# Patient Record
Sex: FEMALE | HISPANIC OR LATINO | Employment: FULL TIME | URBAN - METROPOLITAN AREA
[De-identification: names, ages, dates, MRNs, and addresses within clinical notes are randomized per-mention and may not be internally consistent; named-entity substitution may affect disease eponyms.]

---

## 2018-04-24 ENCOUNTER — HOSPITAL ENCOUNTER (OUTPATIENT)
Dept: LAB | Facility: MEDICAL CENTER | Age: 43
End: 2018-04-24
Attending: FAMILY MEDICINE
Payer: COMMERCIAL

## 2018-04-24 LAB
ALBUMIN SERPL BCP-MCNC: 4.5 G/DL (ref 3.2–4.9)
ALBUMIN/GLOB SERPL: 1.3 G/DL
ALP SERPL-CCNC: 43 U/L (ref 30–99)
ALT SERPL-CCNC: 13 U/L (ref 2–50)
ANION GAP SERPL CALC-SCNC: 9 MMOL/L (ref 0–11.9)
AST SERPL-CCNC: 15 U/L (ref 12–45)
BASOPHILS # BLD AUTO: 0.7 % (ref 0–1.8)
BASOPHILS # BLD: 0.05 K/UL (ref 0–0.12)
BILIRUB SERPL-MCNC: 0.6 MG/DL (ref 0.1–1.5)
BUN SERPL-MCNC: 12 MG/DL (ref 8–22)
CALCIUM SERPL-MCNC: 9.8 MG/DL (ref 8.5–10.5)
CHLORIDE SERPL-SCNC: 101 MMOL/L (ref 96–112)
CHOLEST SERPL-MCNC: 181 MG/DL (ref 100–199)
CO2 SERPL-SCNC: 25 MMOL/L (ref 20–33)
CREAT SERPL-MCNC: 0.48 MG/DL (ref 0.5–1.4)
EOSINOPHIL # BLD AUTO: 0.1 K/UL (ref 0–0.51)
EOSINOPHIL NFR BLD: 1.5 % (ref 0–6.9)
ERYTHROCYTE [DISTWIDTH] IN BLOOD BY AUTOMATED COUNT: 37.4 FL (ref 35.9–50)
GLOBULIN SER CALC-MCNC: 3.6 G/DL (ref 1.9–3.5)
GLUCOSE SERPL-MCNC: 103 MG/DL (ref 65–99)
HCT VFR BLD AUTO: 42.2 % (ref 37–47)
HDLC SERPL-MCNC: 46 MG/DL
HGB BLD-MCNC: 15.2 G/DL (ref 12–16)
IMM GRANULOCYTES # BLD AUTO: 0.03 K/UL (ref 0–0.11)
IMM GRANULOCYTES NFR BLD AUTO: 0.4 % (ref 0–0.9)
LDLC SERPL CALC-MCNC: 111 MG/DL
LYMPHOCYTES # BLD AUTO: 2.55 K/UL (ref 1–4.8)
LYMPHOCYTES NFR BLD: 37.5 % (ref 22–41)
MCH RBC QN AUTO: 32 PG (ref 27–33)
MCHC RBC AUTO-ENTMCNC: 36 G/DL (ref 33.6–35)
MCV RBC AUTO: 88.8 FL (ref 81.4–97.8)
MONOCYTES # BLD AUTO: 0.45 K/UL (ref 0–0.85)
MONOCYTES NFR BLD AUTO: 6.6 % (ref 0–13.4)
NEUTROPHILS # BLD AUTO: 3.62 K/UL (ref 2–7.15)
NEUTROPHILS NFR BLD: 53.3 % (ref 44–72)
NRBC # BLD AUTO: 0 K/UL
NRBC BLD-RTO: 0 /100 WBC
PLATELET # BLD AUTO: 247 K/UL (ref 164–446)
PMV BLD AUTO: 9.2 FL (ref 9–12.9)
POTASSIUM SERPL-SCNC: 3.7 MMOL/L (ref 3.6–5.5)
PROT SERPL-MCNC: 8.1 G/DL (ref 6–8.2)
RBC # BLD AUTO: 4.75 M/UL (ref 4.2–5.4)
SODIUM SERPL-SCNC: 135 MMOL/L (ref 135–145)
TRIGL SERPL-MCNC: 118 MG/DL (ref 0–149)
TSH SERPL DL<=0.005 MIU/L-ACNC: 1.81 UIU/ML (ref 0.38–5.33)
WBC # BLD AUTO: 6.8 K/UL (ref 4.8–10.8)

## 2018-04-24 PROCEDURE — 80061 LIPID PANEL: CPT

## 2018-04-24 PROCEDURE — 36415 COLL VENOUS BLD VENIPUNCTURE: CPT

## 2018-04-24 PROCEDURE — 85025 COMPLETE CBC W/AUTO DIFF WBC: CPT

## 2018-04-24 PROCEDURE — 80053 COMPREHEN METABOLIC PANEL: CPT

## 2018-04-24 PROCEDURE — 84443 ASSAY THYROID STIM HORMONE: CPT

## 2018-10-30 ENCOUNTER — HOSPITAL ENCOUNTER (OUTPATIENT)
Dept: LAB | Facility: MEDICAL CENTER | Age: 43
End: 2018-10-30
Payer: COMMERCIAL

## 2018-10-30 LAB
BDY FAT % MEASURED: 36.4 %
BP DIAS: 44 MMHG
BP SYS: 114 MMHG
CHOLEST SERPL-MCNC: 200 MG/DL (ref 100–199)
DIABETES HTDIA: NO
EVENT NAME HTEVT: NORMAL
FASTING STATUS PATIENT QL REPORTED: NORMAL
GLUCOSE SERPL-MCNC: 103 MG/DL (ref 65–99)
HDLC SERPL-MCNC: 41 MG/DL
HYPERTENSION HTHYP: NO
LDLC SERPL CALC-MCNC: 109 MG/DL
SCREENING LOC CITY HTCIT: NORMAL
SCREENING LOC STATE HTSTA: NORMAL
SCREENING LOCATION HTLOC: NORMAL
SUBSCRIBER ID HTSID: NORMAL
TRIGL SERPL-MCNC: 248 MG/DL (ref 0–149)

## 2018-11-14 ENCOUNTER — OFFICE VISIT (OUTPATIENT)
Dept: URGENT CARE | Facility: CLINIC | Age: 43
End: 2018-11-14
Payer: COMMERCIAL

## 2018-11-14 VITALS
DIASTOLIC BLOOD PRESSURE: 60 MMHG | HEART RATE: 77 BPM | BODY MASS INDEX: 27.29 KG/M2 | SYSTOLIC BLOOD PRESSURE: 110 MMHG | TEMPERATURE: 97 F | OXYGEN SATURATION: 99 % | WEIGHT: 154 LBS | RESPIRATION RATE: 16 BRPM | HEIGHT: 63 IN

## 2018-11-14 DIAGNOSIS — M54.6 ACUTE LEFT-SIDED THORACIC BACK PAIN: ICD-10-CM

## 2018-11-14 PROCEDURE — 99203 OFFICE O/P NEW LOW 30 MIN: CPT | Performed by: PHYSICIAN ASSISTANT

## 2018-11-14 RX ORDER — CYCLOBENZAPRINE HCL 10 MG
10 TABLET ORAL 3 TIMES DAILY PRN
Qty: 30 TAB | Refills: 0 | Status: SHIPPED | OUTPATIENT
Start: 2018-11-14

## 2018-11-14 ASSESSMENT — ENCOUNTER SYMPTOMS
ABDOMINAL PAIN: 0
DIARRHEA: 0
DIZZINESS: 0
VOMITING: 0
BOWEL INCONTINENCE: 0
BACK PAIN: 1
LEG PAIN: 0
SHORTNESS OF BREATH: 0
CHILLS: 0
FEVER: 0
NUMBNESS: 0
NAUSEA: 0

## 2018-11-14 NOTE — PROGRESS NOTES
"Subjective:      Qing Gaxiola is a 42 y.o. female who presents with Back Pain (8 months ago but it went away now it hurts again on Lt upper. pain feels numbness and a poke and aslo feels like a spasm )            Back Pain   This is a recurrent problem. The current episode started 1 to 4 weeks ago (2 weeks). The problem occurs constantly. The problem is unchanged. The pain is present in the thoracic spine. The quality of the pain is described as aching and stabbing. The pain is at a severity of 3/10. The pain is moderate. The symptoms are aggravated by position. Pertinent negatives include no abdominal pain, bladder incontinence, bowel incontinence, chest pain, dysuria, fever, leg pain or numbness. She has tried heat and NSAIDs for the symptoms. The treatment provided mild relief.     Patient presents to urgent care reporting a 2 week history of left sided mid-back pain. She reports she has similar back pain about 1 year ago that resolved. No recent falls or trauma to the area. No bowel/bladder incontinence or distal numbness/tingling.     Review of Systems   Constitutional: Negative for chills and fever.   HENT: Negative for congestion.    Respiratory: Negative for shortness of breath.    Cardiovascular: Negative for chest pain.   Gastrointestinal: Negative for abdominal pain, bowel incontinence, diarrhea, nausea and vomiting.   Genitourinary: Negative.  Negative for bladder incontinence and dysuria.   Musculoskeletal: Positive for back pain.   Skin: Negative for rash.   Neurological: Negative for dizziness and numbness.          Objective:     /60   Pulse 77   Temp 36.1 °C (97 °F)   Resp 16   Ht 1.6 m (5' 3\")   Wt 69.9 kg (154 lb)   SpO2 99%   BMI 27.28 kg/m²      Physical Exam   Constitutional: She is oriented to person, place, and time. She appears well-developed and well-nourished. No distress.   HENT:   Head: Normocephalic and atraumatic.   Eyes: Pupils are equal, round, and reactive to " light.   Neck: Normal range of motion.   Cardiovascular: Normal rate.    Pulmonary/Chest: Effort normal.   Musculoskeletal: Normal range of motion.        Thoracic back: She exhibits tenderness and pain. She exhibits normal range of motion, no bony tenderness and no spasm.        Back:    No midline spinal tenderness or step off deformities noted. Muscular tenderness of left paraspinal musculature of thoracic region.    Neurological: She is alert and oriented to person, place, and time.   Skin: Skin is warm and dry. She is not diaphoretic.   Psychiatric: She has a normal mood and affect. Her behavior is normal.   Nursing note and vitals reviewed.       PMH:  has no past medical history on file.  MEDS:   Current Outpatient Prescriptions:   •  cyclobenzaprine (FLEXERIL) 10 MG Tab, Take 1 Tab by mouth 3 times a day as needed., Disp: 30 Tab, Rfl: 0  ALLERGIES: No Known Allergies  SURGHX: History reviewed. No pertinent surgical history.  SOCHX:  reports that she has quit smoking. She has never used smokeless tobacco. She reports that she does not drink alcohol or use drugs.  FH: family history is not on file.    Assessment/Plan:     1. Acute left-sided thoracic back pain  - cyclobenzaprine (FLEXERIL) 10 MG Tab; Take 1 Tab by mouth 3 times a day as needed.  Dispense: 30 Tab; Refill: 0   - Will cause sedation, avoid driving, operating heavy machinery, and drinking alcohol    Encouraged icing/heating 2-3 times daily followed by gentle massage and stretching. OTC nsaids as needed for pain. Call or return to office if symptoms persist or worsen. The patient demonstrated a good understanding and agreed with the treatment plan.

## 2020-01-24 ENCOUNTER — OFFICE VISIT (OUTPATIENT)
Dept: URGENT CARE | Facility: CLINIC | Age: 45
End: 2020-01-24
Payer: COMMERCIAL

## 2020-01-24 VITALS
DIASTOLIC BLOOD PRESSURE: 72 MMHG | WEIGHT: 152.8 LBS | TEMPERATURE: 97.6 F | HEART RATE: 64 BPM | OXYGEN SATURATION: 95 % | HEIGHT: 61 IN | BODY MASS INDEX: 28.85 KG/M2 | SYSTOLIC BLOOD PRESSURE: 110 MMHG | RESPIRATION RATE: 16 BRPM

## 2020-01-24 DIAGNOSIS — H69.93 EUSTACHIAN TUBE DYSFUNCTION, BILATERAL: ICD-10-CM

## 2020-01-24 DIAGNOSIS — M26.623 BILATERAL TEMPOROMANDIBULAR JOINT PAIN: ICD-10-CM

## 2020-01-24 DIAGNOSIS — H72.92 PERFORATION OF LEFT TYMPANIC MEMBRANE: ICD-10-CM

## 2020-01-24 DIAGNOSIS — H61.22 IMPACTED CERUMEN OF LEFT EAR: ICD-10-CM

## 2020-01-24 PROCEDURE — 69210 REMOVE IMPACTED EAR WAX UNI: CPT | Performed by: PHYSICIAN ASSISTANT

## 2020-01-24 PROCEDURE — 99214 OFFICE O/P EST MOD 30 MIN: CPT | Mod: 25 | Performed by: PHYSICIAN ASSISTANT

## 2020-01-24 RX ORDER — NEOMYCIN SULFATE, POLYMYXIN B SULFATE AND HYDROCORTISONE 10; 3.5; 1 MG/ML; MG/ML; [USP'U]/ML
3 SUSPENSION/ DROPS AURICULAR (OTIC) 4 TIMES DAILY
Qty: 6 ML | Refills: 0 | Status: SHIPPED | OUTPATIENT
Start: 2020-01-24 | End: 2020-01-29

## 2020-01-24 ASSESSMENT — ENCOUNTER SYMPTOMS
COUGH: 0
DIZZINESS: 0
HEADACHES: 1
CHILLS: 1
FEVER: 1
SORE THROAT: 1

## 2020-01-24 NOTE — PATIENT INSTRUCTIONS
Temporomandibular Joint Syndrome  Temporomandibular joint (TMJ) syndrome is a condition that affects the joints between your jaw and your skull. The TMJs are located near your ears and allow your jaw to open and close. These joints and the nearby muscles are involved in all movements of the jaw. People with TMJ syndrome have pain in the area of these joints and muscles. Chewing, biting, or other movements of the jaw can be difficult or painful.  TMJ syndrome can be caused by various things. In many cases, the condition is mild and goes away within a few weeks. For some people, the condition can become a long-term problem.  What are the causes?  Possible causes of TMJ syndrome include:  · Grinding your teeth or clenching your jaw. Some people do this when they are under stress.  · Arthritis.  · Injury to the jaw.  · Head or neck injury.  · Teeth or dentures that are not aligned well.  In some cases, the cause of TMJ syndrome may not be known.  What are the signs or symptoms?  The most common symptom is an aching pain on the side of the head in the area of the TMJ. Other symptoms may include:  · Pain when moving your jaw, such as when chewing or biting.  · Being unable to open your jaw all the way.  · Making a clicking sound when you open your mouth.  · Headache.  · Earache.  · Neck or shoulder pain.  How is this diagnosed?  Diagnosis can usually be made based on your symptoms, your medical history, and a physical exam. Your health care provider may check the range of motion of your jaw. Imaging tests, such as X-rays or an MRI, are sometimes done. You may need to see your dentist to determine if your teeth and jaw are lined up correctly.  How is this treated?  TMJ syndrome often goes away on its own. If treatment is needed, the options may include:  · Eating soft foods and applying ice or heat.  · Medicines to relieve pain or inflammation.  · Medicines to relax the muscles.  · A splint, bite plate, or mouthpiece to  prevent teeth grinding or jaw clenching.  · Relaxation techniques or counseling to help reduce stress.  · Transcutaneous electrical nerve stimulation (TENS). This helps to relieve pain by applying an electrical current through the skin.  · Acupuncture. This is sometimes helpful to relieve pain.  · Jaw surgery. This is rarely needed.  Follow these instructions at home:  · Take medicines only as directed by your health care provider.  · Eat a soft diet if you are having trouble chewing.  · Apply ice to the painful area.  ¨ Put ice in a plastic bag.  ¨ Place a towel between your skin and the bag.  ¨ Leave the ice on for 20 minutes, 2-3 times a day.  · Apply a warm compress to the painful area as directed.  · Massage your jaw area and perform any jaw stretching exercises as recommended by your health care provider.  · If you were given a mouthpiece or bite plate, wear it as directed.  · Avoid foods that require a lot of chewing. Do not chew gum.  · Keep all follow-up visits as directed by your health care provider. This is important.  Contact a health care provider if:  · You are having trouble eating.  · You have new or worsening symptoms.  Get help right away if:  · Your jaw locks open or closed.  This information is not intended to replace advice given to you by your health care provider. Make sure you discuss any questions you have with your health care provider.  Document Released: 09/12/2002 Document Revised: 08/17/2017 Document Reviewed: 07/23/2015  [employer] Interactive Patient Education © 2017 [employer]    Eustachian Tube Dysfunction  Introduction  The eustachian tube connects the middle ear to the back of the nose. It regulates air pressure in the middle ear by allowing air to move between the ear and nose. It also helps to drain fluid from the middle ear space. When the eustachian tube does not function properly, air pressure, fluid, or both can build up in the middle ear.  Eustachian tube dysfunction can  "affect one or both ears.  What are the causes?  This condition happens when the eustachian tube becomes blocked or cannot open normally. This may result from:  · Ear infections.  · Colds and other upper respiratory infections.  · Allergies.  · Irritation, such as from cigarette smoke or acid from the stomach coming up into the esophagus (gastroesophageal reflux).  · Sudden changes in air pressure, such as from descending in an airplane.  · Abnormal growths in the nose or throat, such as nasal polyps, tumors, or enlarged tissue at the back of the throat (adenoids).  What increases the risk?  This condition may be more likely to develop in people who smoke and people who are overweight. Eustachian tube dysfunction may also be more likely to develop in children, especially children who have:  · Certain birth defects of the mouth, such as cleft palate.  · Large tonsils and adenoids.  What are the signs or symptoms?  Symptoms of this condition may include:  · A feeling of fullness in the ear.  · Ear pain.  · Clicking or popping noises in the ear.  · Ringing in the ear.  · Hearing loss.  · Loss of balance.  Symptoms may get worse when the air pressure around you changes, such as when you travel to an area of high elevation or fly on an airplane.  How is this diagnosed?  This condition may be diagnosed based on:  · Your symptoms.  · A physical exam of your ear, nose, and throat.  · Tests, such as those that measure:  ¨ The movement of your eardrum (tympanogram).  ¨ Your hearing (audiometry).  How is this treated?  Treatment depends on the cause and severity of your condition. If your symptoms are mild, you may be able to relieve your symptoms by moving air into (\"popping\") your ears. If you have symptoms of fluid in your ears, treatment may include:  · Decongestants.  · Antihistamines.  · Nasal sprays or ear drops that contain medicines that reduce swelling (steroids).  In some cases, you may need to have a procedure to " drain the fluid in your eardrum (myringotomy). In this procedure, a small tube is placed in the eardrum to:  · Drain the fluid.  · Restore the air in the middle ear space.  Follow these instructions at home:  · Take over-the-counter and prescription medicines only as told by your health care provider.  · Use techniques to help pop your ears as recommended by your health care provider. These may include:  ¨ Chewing gum.  ¨ Yawning.  ¨ Frequent, forceful swallowing.  ¨ Closing your mouth, holding your nose closed, and gently blowing as if you are trying to blow air out of your nose.  · Do not do any of the following until your health care provider approves:  ¨ Travel to high altitudes.  ¨ Fly in airplanes.  ¨ Work in a pressurized cabin or room.  ¨ Scuba dive.  · Keep your ears dry. Dry your ears completely after showering or bathing.  · Do not smoke.  · Keep all follow-up visits as told by your health care provider. This is important.  Contact a health care provider if:  · Your symptoms do not go away after treatment.  · Your symptoms come back after treatment.  · You are unable to pop your ears.  · You have:  ¨ A fever.  ¨ Pain in your ear.  ¨ Pain in your head or neck.  ¨ Fluid draining from your ear.  · Your hearing suddenly changes.  · You become very dizzy.  · You lose your balance.  This information is not intended to replace advice given to you by your health care provider. Make sure you discuss any questions you have with your health care provider.  Document Released: 01/13/2017 Document Revised: 05/25/2017 Document Reviewed: 01/06/2016  © 2017 [employer]

## 2020-01-24 NOTE — PROGRESS NOTES
"Subjective:   Qing Gaxiola  is a 44 y.o. female who presents for Otalgia (pressure, when moving it feels like a pressure pain, and and has light headed when getting up in the morning ,the jaw and face hurts every morning when she wakes up it hurts possbile since her teeth is grinding or stiff together x 2 weeks )    This is a new problem.  Patient presents to urgent care with 2-week history of ear pressure, sensation that her ears need to \"pop\" and a pressure sensation in the head.  Patient reports that she had upper respiratory illness approximately 2 and half weeks ago with nasal congestion, mild sore throat and mild cough.  This eventually resolved but the ear symptoms persisted.  Patient denies change in hearing.    Patient also mentions that for the past 2 weeks she has been clenching her teeth very tightly when sleeping and she will wake up with pain in the bilateral jaws near the ear.      Otalgia    Associated symptoms include headaches and a sore throat. Pertinent negatives include no coughing or hearing loss.     Review of Systems   Constitutional: Positive for chills, fever and malaise/fatigue.   HENT: Positive for congestion, ear pain and sore throat. Negative for hearing loss and tinnitus.    Respiratory: Negative for cough.    Neurological: Positive for headaches. Negative for dizziness.   All other systems reviewed and are negative.    No Known Allergies  Reviewed past medical, surgical , social and family history.  Reviewed prescription and over-the-counter medications with patient and electronic health record today.     Objective:   /72   Pulse 64   Temp 36.4 °C (97.6 °F) (Temporal)   Resp 16   Ht 1.549 m (5' 1\")   Wt 69.3 kg (152 lb 12.8 oz)   SpO2 95%   BMI 28.87 kg/m²   Physical Exam  Vitals signs reviewed.   Constitutional:       General: She is not in acute distress.     Appearance: She is well-developed. She is not ill-appearing or toxic-appearing.   HENT:      Head: " Normocephalic and atraumatic.        Right Ear: Ear canal and external ear normal. Tympanic membrane is retracted.      Left Ear: Ear canal and external ear normal. There is impacted cerumen. Tympanic membrane is perforated and retracted.      Ears:        Comments: Left EAC 60% occluded with cerumen, this is removed using curette by provider, see procedure note.  Tympanic membrane appears to have small perforation, there is no discharge or drainage from the perforation and ear canal is within normal limits.     Nose: Nose normal.      Mouth/Throat:      Lips: Pink. No lesions.      Mouth: Mucous membranes are moist.      Pharynx: Oropharynx is clear. Uvula midline. No oropharyngeal exudate.      Comments: Tender to palpation and internal muscles of mastication on the left  Eyes:      General: Lids are normal.      Extraocular Movements: Extraocular movements intact.      Conjunctiva/sclera: Conjunctivae normal.      Pupils: Pupils are equal, round, and reactive to light.   Neck:      Musculoskeletal: Normal range of motion and neck supple.   Cardiovascular:      Rate and Rhythm: Normal rate and regular rhythm.      Heart sounds: Normal heart sounds. No murmur. No friction rub. No gallop.    Pulmonary:      Effort: Pulmonary effort is normal. No respiratory distress.      Breath sounds: Normal breath sounds.   Abdominal:      General: Bowel sounds are normal. There is no distension.      Palpations: Abdomen is soft. There is no mass.      Tenderness: There is no tenderness. There is no guarding or rebound.   Musculoskeletal: Normal range of motion.         General: No tenderness or deformity.   Lymphadenopathy:      Head:      Right side of head: No submental, submandibular, tonsillar, preauricular or posterior auricular adenopathy.      Left side of head: No submental, submandibular, tonsillar, preauricular or posterior auricular adenopathy.      Cervical: No cervical adenopathy.      Upper Body:      Right upper  body: No supraclavicular adenopathy.      Left upper body: No supraclavicular adenopathy.   Skin:     General: Skin is warm and dry.      Findings: No rash.   Neurological:      Mental Status: She is alert and oriented to person, place, and time.      Cranial Nerves: Cranial nerves are intact. No cranial nerve deficit.      Sensory: Sensation is intact. No sensory deficit.      Motor: Motor function is intact.      Coordination: Coordination is intact. Coordination normal.      Gait: Gait is intact.   Psychiatric:         Attention and Perception: Attention normal.         Mood and Affect: Mood and affect normal.         Speech: Speech normal.         Behavior: Behavior normal. Behavior is cooperative.         Thought Content: Thought content normal.         Judgment: Judgment normal.           Assessment/Plan:   1. Eustachian tube dysfunction, bilateral  - REFERRAL TO ENT    2. Perforation of left tympanic membrane  - REFERRAL TO ENT  - neomycin-polymyxin-HC (PEDIOTIC HC) 3.5-64192-0 Suspension; Place 3 Drops in ear 4 times a day for 5 days.  Dispense: 6 mL; Refill: 0    3. Impacted cerumen of left ear  - Ear Wax Removal    4. Bilateral temporomandibular joint pain    Printed information with patient education on eustachian tube dysfunction provided.  Encouraged over-the-counter Flonase nasal spray.  Referral placed to ear nose and throat.    Patient is given Pediotic eardrops and referral was placed to ENT for follow-up on possible perforation of left TM.    Avoid jaw thrusting, encourage soft foods, avoid chewing gum.  May use over-the-counter ibuprofen for TMJ pain not to exceed recommended daily dose.  Also recommend moist heat.  May want to speak with dentist regarding mouthguard.  Differential diagnosis, natural history, supportive care, and indications for immediate follow-up discussed.     Red flag warning symptoms and strict ER/follow-up precautions given.  The patient demonstrated a good understanding and  agreed with the treatment plan.  Please note that this note was created using voice recognition speech to text software. Every effort has been made to correct obvious errors.  However, I expect there are errors of grammar and possibly context that were not discovered prior to finalizing the note  SDi Morton PA-C

## 2020-01-24 NOTE — PROCEDURES
Ear Wax Removal  Date/Time: 1/24/2020 10:05 AM  Performed by: Patti Morton P.A.-C.  Authorized by: Patti Morton P.A.-C.     Anesthesia:  Local Anesthetic: none  Location details: left ear  Patient tolerance: Patient tolerated the procedure well with no immediate complications  Procedure type: curette   Sedation:  Patient sedated: no

## 2022-10-05 PROCEDURE — 99283 EMERGENCY DEPT VISIT LOW MDM: CPT

## 2022-10-06 ENCOUNTER — HOSPITAL ENCOUNTER (EMERGENCY)
Facility: MEDICAL CENTER | Age: 47
End: 2022-10-06
Attending: EMERGENCY MEDICINE
Payer: COMMERCIAL

## 2022-10-06 VITALS
DIASTOLIC BLOOD PRESSURE: 74 MMHG | RESPIRATION RATE: 14 BRPM | BODY MASS INDEX: 30.84 KG/M2 | WEIGHT: 163.36 LBS | HEIGHT: 61 IN | SYSTOLIC BLOOD PRESSURE: 133 MMHG | TEMPERATURE: 97.5 F | HEART RATE: 78 BPM | OXYGEN SATURATION: 98 %

## 2022-10-06 DIAGNOSIS — L02.419 ARMPIT ABSCESS: ICD-10-CM

## 2022-10-06 PROCEDURE — A9270 NON-COVERED ITEM OR SERVICE: HCPCS | Performed by: EMERGENCY MEDICINE

## 2022-10-06 PROCEDURE — 700102 HCHG RX REV CODE 250 W/ 637 OVERRIDE(OP): Performed by: EMERGENCY MEDICINE

## 2022-10-06 PROCEDURE — 304217 HCHG IRRIGATION SYSTEM

## 2022-10-06 PROCEDURE — 700111 HCHG RX REV CODE 636 W/ 250 OVERRIDE (IP): Performed by: EMERGENCY MEDICINE

## 2022-10-06 PROCEDURE — 303977 HCHG I & D

## 2022-10-06 RX ORDER — OXYCODONE HYDROCHLORIDE AND ACETAMINOPHEN 5; 325 MG/1; MG/1
2 TABLET ORAL ONCE
Status: COMPLETED | OUTPATIENT
Start: 2022-10-06 | End: 2022-10-06

## 2022-10-06 RX ORDER — SULFAMETHOXAZOLE AND TRIMETHOPRIM 800; 160 MG/1; MG/1
1 TABLET ORAL 2 TIMES DAILY
Qty: 10 TABLET | Refills: 0 | Status: SHIPPED | OUTPATIENT
Start: 2022-10-06 | End: 2022-10-11

## 2022-10-06 RX ORDER — HYDROCODONE BITARTRATE AND ACETAMINOPHEN 5; 325 MG/1; MG/1
1-2 TABLET ORAL EVERY 6 HOURS PRN
Qty: 12 TABLET | Refills: 0 | Status: SHIPPED | OUTPATIENT
Start: 2022-10-06 | End: 2022-10-09

## 2022-10-06 RX ORDER — SULFAMETHOXAZOLE AND TRIMETHOPRIM 800; 160 MG/1; MG/1
1 TABLET ORAL ONCE
Status: COMPLETED | OUTPATIENT
Start: 2022-10-06 | End: 2022-10-06

## 2022-10-06 RX ADMIN — OXYCODONE AND ACETAMINOPHEN 2 TABLET: 5; 325 TABLET ORAL at 02:08

## 2022-10-06 RX ADMIN — LIDOCAINE HYDROCHLORIDE 10 ML: 10 INJECTION, SOLUTION EPIDURAL; INFILTRATION; INTRACAUDAL; PERINEURAL at 02:15

## 2022-10-06 RX ADMIN — SULFAMETHOXAZOLE AND TRIMETHOPRIM 1 TABLET: 800; 160 TABLET ORAL at 02:05

## 2022-10-06 NOTE — DISCHARGE INSTRUCTIONS
Please wash out your wound at least 5 times a day in the shower over the next 2 to 3 days to keep the wound open and draining.  Take the pain meds as needed and complete antibiotics.  Return to the ED if the wound closes too early if you get worsening swelling or severe pain    Do not drive, operate any machinery, or partake in dangerous activities that require maximum physical and mental performance while taking the prescribed pain killer. Do not take tylenol or tylenol containing products in addition to the pain killer that was prescibed, as the excess tylenol can cause life threatening liver problems. Do not combine this drug with alcohol or other sedatives or narcotics. Follow up with your primary care doctor in regards to the future management of this medication.

## 2022-10-06 NOTE — ED TRIAGE NOTES
"Chief Complaint   Patient presents with    Arm Pain     Left armpit pain and swelling     Patient ambulatory to triage for the above complaint. Patient states that she had this same problem 10 years ago and it just started to come back 3 days ago. Patient states that in the past they had to drain the area.     Pt is alert and oriented, speaking in full sentences, follows commands and responds appropriately to questions. Resp are even and unlabored.      Pt placed in lobby. Pt educated on triage process. Pt encouraged to alert staff for any changes.     Patient and staff wearing appropriate PPE.    /70   Pulse 79   Temp 36.6 °C (97.8 °F) (Temporal)   Resp 18   Ht 1.549 m (5' 1\")   Wt 74.1 kg (163 lb 5.8 oz)   SpO2 96%    "

## 2022-10-06 NOTE — ED NOTES
Pt ambulated to room with steady gait. Dressed in gown. Call light in reach. Provided blankets for comfort. Up for ERP to see.

## 2022-10-06 NOTE — ED PROVIDER NOTES
"ED Provider Note    CHIEF COMPLAINT  Chief Complaint   Patient presents with    Arm Pain     Left armpit pain and swelling       HPI  Qing Gaxiola is a 46 y.o. female who presents to the emergency room with chief complaint of 3 days of left underarm swelling.  She states is extremely tender the touch and worse with movement.  She denies fevers chills weakness numbness or tingling.  She states has had a similar there 10 years ago but was much bigger and treated having a very large incision drainage.  No history of diabetes she states he taken ibuprofen and Tylenol home for pain without relief of symptoms.    REVIEW OF SYSTEMS  Positives as above. Pertinent negatives include fevers chills weakness numbness tingling arm swelling  All other review of systems are negative    PAST MEDICAL HISTORY       SOCIAL HISTORY  Social History     Tobacco Use    Smoking status: Former     Types: Cigarettes     Quit date: 10/1/2013     Years since quittin.0    Smokeless tobacco: Never   Vaping Use    Vaping Use: Never used   Substance and Sexual Activity    Alcohol use: No    Drug use: No    Sexual activity: Not on file       SURGICAL HISTORY  patient denies any surgical history    CURRENT MEDICATIONS  Home Medications       Reviewed by Bear Madden R.N. (Registered Nurse) on 10/05/22 at 2312  Med List Status: Not Addressed     Medication Last Dose Status   cyclobenzaprine (FLEXERIL) 10 MG Tab  Active                    ALLERGIES  No Known Allergies    PHYSICAL EXAM  VITAL SIGNS: /75   Pulse 88   Temp 36.4 °C (97.5 °F) (Temporal)   Resp 18   Ht 1.549 m (5' 1\")   Wt 74.1 kg (163 lb 5.8 oz)   SpO2 97% Comment: Room air  BMI 30.87 kg/m²    Pulse ox interpretation: I interpret this pulse ox as normal.  Constitutional: Alert in no apparent distress.  HENT: Normocephalic, Atraumatic, MMM  Eyes: PERound. Conjunctiva normal, non-icteric.   Heart: Regular rate and rhythm, no murmurs.    Lungs: Clear to " auscultation bilaterally. No resp distress, breath sounds equal  Abdomen: Non-tender, non-distended, normal bowel sounds  EXT: Left axilla there is old large scar approximately 7 cm cranial to that she is got an area of fluctuance approximately 4 cm x 2 cm without induration only mild erythema  Skin: Warm, Dry, No erythema, No rash.   Neurologic: Alert and oriented, Grossly non-focal.       DIFFERENTIAL DIAGNOSIS AND WORK UP PLAN    This is a 46 y.o. female who presents with left axillary abscess without cellulitis.  I consented the patient for incision drainage she will be treated with Bactrim as well as oral pain management.  Her tetanus is up-to-date    Pertinent Lab Findings  Labs Reviewed - No data to display    Radiology  No orders to display     The radiologist's interpretation of all radiological studies have been reviewed by me.    INCISION AND DRAINAGE PROCEDURE NOTE:  Patient identification was confirmed and consent was obtained.    Site: L arm pit  Sterile procedures observed  Needle size: 27  Anesthetic used (type and amt): lidocaine 1% without epi  Blade size: 11  Drainage: moderate blood and pus     Site anesthetized, incision made over site, wound drained and explored loculations, rinsed with copious amounts of normal saline, covered with dry, sterile dressing. Pt tolerated procedure well without complications. Instructions for care discussed verbally and pt provided with additional written instructions for homecare and f/u.      COURSE & MEDICAL DECISION MAKING  Pertinent Labs & Imaging studies reviewed. (See chart for details)    In prescribing controlled substances to this patient, I certify that I have obtained and reviewed the medical history of Qing Gaxiola. I have also made a good isabel effort to obtain applicable records from other providers who have treated the patient and records did not demonstrate any increased risk of substance abuse that would prevent me from prescribing  controlled substances.     I have conducted a physical exam and documented it. I have reviewed Ms. Gaxiola’s prescription history as maintained by the [place] Prescription Monitoring Program.     I have assessed the patient’s risk for abuse, dependency, and addiction using the validated Opioid Risk Tool available at https://www.mdcalc.com/zozldw-xasv-spjx-ort-narcotic-abuse.     Given the above, I believe the benefits of controlled substance therapy outweigh the risks. The reasons for prescribing controlled substances include non-narcotic, oral analgesic alternatives have been inadequate for pain control. Accordingly, I have discussed the risk and benefits, treatment plan, and alternative therapies with the patient.     Patient tolerated the procedure with little bit of difficulty she was sent home with oral antibiotics we discussed wash at least 5 times a day over the next 2 days and strict precautions.  She understands feels comfortable at home    I verified that the patient was wearing a mask and I was wearing appropriate PPE every time I entered the room. The patient's mask was on the patient at all times during my encounter except for a brief view of the oropharynx.    The patient will return for new or worsening symptoms and is stable at the time of discharge.    The patient is referred to a primary physician for blood pressure management, diabetic screening, and for all other preventative health concerns.    DISPOSITION:  Patient will be discharged home in stable condition.    FOLLOW UP:  Tate Rios M.D.  [address]  [facility] [id number] [phone]    Schedule an appointment as soon as possible for a visit       [facility], Emergency Dept  [address] [id number] [phone]    If symptoms worsen      OUTPATIENT MEDICATIONS:  New Prescriptions    HYDROCODONE-ACETAMINOPHEN (NORCO) 5-325 MG TAB PER TABLET    Take 1-2 Tablets by mouth every 6  hours as needed (severe pain) for up to 3 days.    SULFAMETHOXAZOLE-TRIMETHOPRIM (BACTRIM DS) 800-160 MG TABLET    Take 1 Tablet by mouth 2 times a day for 5 days.           FINAL IMPRESSION  1. Armpit abscess - Left  sulfamethoxazole-trimethoprim (BACTRIM DS) 800-160 MG tablet    HYDROcodone-acetaminophen (NORCO) 5-325 MG Tab per tablet                 Electronically signed by: Natividad Mckeon M.D., 10/6/2022 1:51 AM    This dictation has been created using voice recognition software and/or scribes. The accuracy of the dictation is limited by the abilities of the software and the expertise of the scribes. I expect there may be some errors of grammar and possibly content. I made every attempt to manually correct the errors within my dictation. However, errors related to voice recognition software and/or scribes may still exist and should be interpreted within the appropriate context.

## 2025-04-18 ENCOUNTER — HOSPITAL ENCOUNTER (EMERGENCY)
Facility: MEDICAL CENTER | Age: 50
End: 2025-04-18
Payer: COMMERCIAL

## 2025-04-18 ENCOUNTER — OFFICE VISIT (OUTPATIENT)
Dept: URGENT CARE | Facility: CLINIC | Age: 50
End: 2025-04-18
Payer: COMMERCIAL

## 2025-04-18 VITALS
HEART RATE: 70 BPM | SYSTOLIC BLOOD PRESSURE: 128 MMHG | WEIGHT: 164.9 LBS | BODY MASS INDEX: 31.13 KG/M2 | OXYGEN SATURATION: 99 % | DIASTOLIC BLOOD PRESSURE: 74 MMHG | RESPIRATION RATE: 14 BRPM | HEIGHT: 61 IN | TEMPERATURE: 97.1 F

## 2025-04-18 DIAGNOSIS — R51.9 ACUTE NONINTRACTABLE HEADACHE, UNSPECIFIED HEADACHE TYPE: ICD-10-CM

## 2025-04-18 DIAGNOSIS — H57.12 PAIN OF LEFT EYE: ICD-10-CM

## 2025-04-18 DIAGNOSIS — H57.89 RED EYE: ICD-10-CM

## 2025-04-18 PROCEDURE — 3078F DIAST BP <80 MM HG: CPT

## 2025-04-18 PROCEDURE — 3074F SYST BP LT 130 MM HG: CPT

## 2025-04-18 PROCEDURE — 99204 OFFICE O/P NEW MOD 45 MIN: CPT

## 2025-04-18 ASSESSMENT — ENCOUNTER SYMPTOMS
CHILLS: 0
WHEEZING: 0
FOCAL WEAKNESS: 0
SEIZURES: 0
NECK PAIN: 0
NAUSEA: 0
COUGH: 0
WEAKNESS: 0
EYE REDNESS: 1
SHORTNESS OF BREATH: 0
SPEECH CHANGE: 0
MYALGIAS: 0
SINUS PAIN: 0
ABDOMINAL PAIN: 0
FEVER: 0
LOSS OF CONSCIOUSNESS: 0
DIZZINESS: 0
VOMITING: 0
STRIDOR: 0
PHOTOPHOBIA: 0
EYE DISCHARGE: 0
SPUTUM PRODUCTION: 0
TINGLING: 0
DIARRHEA: 0
HEADACHES: 1
DOUBLE VISION: 0
EYE PAIN: 1
SORE THROAT: 0
SENSORY CHANGE: 0

## 2025-04-18 ASSESSMENT — VISUAL ACUITY: OU: 1

## 2025-04-18 NOTE — LETTER
ALHAJI  [facility]  [address] [id number]     April 18, 2025    Patient: Qing Gaxiola   YOB: 1975   Date of Visit: 4/18/2025       To Whom It May Concern:    Qing Gaxiola was seen and treated in our department on 4/18/2025.     Please excuse Qing from work 4/18/25-4/19/25.           Sincerely,     LEVON Larson.

## 2025-04-18 NOTE — PROGRESS NOTES
Subjective     Qing Gaxiola is a 49 y.o. female who presents with Eye Problem (xMonday. L eye red, painful, headache, dizzy)    HPI:   Qing is a 50yo female presenting today for evaluation of left eye redness and pain x4 days. Reports associated headache and tinnitus of left ear. Reports blurred vision with no vision loss or visual changes. Denies trauma to eye. No current foreign body sensation. Denies photophobia. She does not wear contacts. Denies flashes/floaters. No vomiting. Denies pain with extraocular movement. No tenderness to palpitation of periorbital cavity. Denies purulent discharge or crusting of eye lashes. No rash. Denies previous eye surgery.          Review of Systems   Constitutional:  Negative for chills, fever and malaise/fatigue.   HENT:  Positive for ear pain and tinnitus. Negative for congestion, ear discharge, sinus pain and sore throat.    Eyes:  Positive for pain and redness. Negative for double vision, photophobia and discharge.   Respiratory:  Negative for cough, sputum production, shortness of breath, wheezing and stridor.    Cardiovascular:  Negative for chest pain.   Gastrointestinal:  Negative for abdominal pain, diarrhea, nausea and vomiting.   Musculoskeletal:  Negative for myalgias and neck pain.   Skin:  Negative for itching and rash.   Neurological:  Positive for headaches. Negative for dizziness, tingling, sensory change, speech change, focal weakness, seizures, loss of consciousness and weakness.     History reviewed. No pertinent past medical history.     History reviewed. No pertinent surgical history.     Patient has no known allergies.     Social History     Tobacco Use    Smoking status: Former     Current packs/day: 0.00     Types: Cigarettes     Quit date: 10/1/2013     Years since quittin.5    Smokeless tobacco: Never   Vaping Use    Vaping status: Never Used   Substance Use Topics    Alcohol use: No    Drug use: No     History reviewed. No pertinent  "family history.     Medications, Allergies, and current problem list reviewed today in Epic.      Objective     /74   Pulse 70   Temp 36.2 °C (97.1 °F)   Resp 14   Ht 1.549 m (5' 1\")   Wt 74.8 kg (164 lb 14.4 oz)   SpO2 99%   BMI 31.16 kg/m²      Physical Exam  Vitals reviewed.   Constitutional:       General: She is not in acute distress.  HENT:      Head: No right periorbital erythema or left periorbital erythema.      Jaw: There is normal jaw occlusion. No tenderness, swelling, pain on movement or malocclusion.      Right Ear: Tympanic membrane, ear canal and external ear normal. There is impacted cerumen.      Left Ear: Tympanic membrane, ear canal and external ear normal. There is impacted cerumen.      Nose: Nose normal. No congestion.      Right Sinus: No maxillary sinus tenderness or frontal sinus tenderness.      Left Sinus: No maxillary sinus tenderness or frontal sinus tenderness.      Mouth/Throat:      Mouth: Mucous membranes are moist.      Pharynx: Uvula midline. No oropharyngeal exudate, posterior oropharyngeal erythema or uvula swelling.      Tonsils: No tonsillar abscesses.   Eyes:      General: Vision grossly intact. Gaze aligned appropriately. No visual field deficit.        Right eye: No foreign body, discharge or hordeolum.         Left eye: No foreign body, discharge or hordeolum.      Extraocular Movements: Extraocular movements intact.      Right eye: No nystagmus.      Left eye: No nystagmus.      Conjunctiva/sclera:      Right eye: Right conjunctiva is not injected. No exudate or hemorrhage.     Left eye: Left conjunctiva is injected. Hemorrhage present. No exudate.     Pupils: Pupils are equal, round, and reactive to light.      Right eye: Pupil is round, reactive and not sluggish.      Left eye: Pupil is round, reactive and not sluggish.      Funduscopic exam:     Right eye: Red reflex present.         Left eye: Red reflex present.  Cardiovascular:      Rate and Rhythm: " Normal rate and regular rhythm.      Pulses: Normal pulses.      Heart sounds: Normal heart sounds.   Pulmonary:      Effort: Pulmonary effort is normal. No tachypnea, accessory muscle usage, prolonged expiration, respiratory distress or retractions.      Breath sounds: Normal breath sounds. No stridor, decreased air movement or transmitted upper airway sounds. No wheezing, rhonchi or rales.   Musculoskeletal:         General: Normal range of motion.      Cervical back: Normal range of motion and neck supple. No rigidity or tenderness. Normal range of motion.   Lymphadenopathy:      Cervical: No cervical adenopathy.   Skin:     General: Skin is warm and dry.      Findings: No rash.   Neurological:      Mental Status: She is alert. Mental status is at baseline.      Sensory: Sensation is intact.      Motor: Motor function is intact.      Coordination: Coordination is intact.      Gait: Gait is intact.   Psychiatric:         Mood and Affect: Mood normal.         Behavior: Behavior normal.         Thought Content: Thought content normal.       Procedure: Cerumen Removal  Risks and benefits of procedure discussed  Cerumen removed with curette and lavage after softening agent instilled  Patient tolerated well  Post procedure exam with clear canal and normal TM    Assessment & Plan    1. Red eye     2. Pain of left eye     3. Acute nonintractable headache, unspecified headache type        MDM/Comments:   Patient presenting with painful red eye. Positive headache and tinnitus of left ear.   Discussion with patient regarding limited ability of urgent care setting to perform a more in depth eye examination due to limited resources. Unable to rule out emergent eye pathology, unable to measure IOP. Patient advised to present to emergency room for further evaluation.   Patient verbalizes understanding and is in agreement with the plan of care.      Differential diagnosis, natural history, supportive care, and indications for  immediate follow-up discussed.        Disposition:     Higher level care via private car in stable condition                           Electronically signed by SUDEEP Simms

## 2025-04-21 ENCOUNTER — TELEPHONE (OUTPATIENT)
Dept: HEALTH INFORMATION MANAGEMENT | Facility: OTHER | Age: 50
End: 2025-04-21
Payer: COMMERCIAL